# Patient Record
Sex: FEMALE | Race: WHITE | NOT HISPANIC OR LATINO
[De-identification: names, ages, dates, MRNs, and addresses within clinical notes are randomized per-mention and may not be internally consistent; named-entity substitution may affect disease eponyms.]

---

## 2020-11-18 PROBLEM — Z00.00 ENCOUNTER FOR PREVENTIVE HEALTH EXAMINATION: Status: ACTIVE | Noted: 2020-11-18

## 2020-11-24 ENCOUNTER — APPOINTMENT (OUTPATIENT)
Dept: ENDOCRINOLOGY | Facility: CLINIC | Age: 30
End: 2020-11-24
Payer: COMMERCIAL

## 2020-11-24 VITALS
HEIGHT: 66 IN | RESPIRATION RATE: 16 BRPM | TEMPERATURE: 98.2 F | OXYGEN SATURATION: 99 % | SYSTOLIC BLOOD PRESSURE: 126 MMHG | BODY MASS INDEX: 38.09 KG/M2 | WEIGHT: 237 LBS | HEART RATE: 61 BPM | DIASTOLIC BLOOD PRESSURE: 78 MMHG

## 2020-11-24 DIAGNOSIS — Z83.2 FAMILY HISTORY OF DISEASES OF THE BLOOD AND BLOOD-FORMING ORGANS AND CERTAIN DISORDERS INVOLVING THE IMMUNE MECHANISM: ICD-10-CM

## 2020-11-24 DIAGNOSIS — Z78.9 OTHER SPECIFIED HEALTH STATUS: ICD-10-CM

## 2020-11-24 DIAGNOSIS — Z83.49 FAMILY HISTORY OF OTHER ENDOCRINE, NUTRITIONAL AND METABOLIC DISEASES: ICD-10-CM

## 2020-11-24 DIAGNOSIS — L30.9 DERMATITIS, UNSPECIFIED: ICD-10-CM

## 2020-11-24 PROCEDURE — 99204 OFFICE O/P NEW MOD 45 MIN: CPT

## 2020-11-24 NOTE — PHYSICAL EXAM
[Alert] : alert [Well Nourished] : well nourished [No Acute Distress] : no acute distress [Well Developed] : well developed [Normal Sclera/Conjunctiva] : normal sclera/conjunctiva [EOMI] : extra ocular movement intact [No Proptosis] : no proptosis [Normal Oropharynx] : the oropharynx was normal [No Respiratory Distress] : no respiratory distress [No Accessory Muscle Use] : no accessory muscle use [Clear to Auscultation] : lungs were clear to auscultation bilaterally [Normal S1, S2] : normal S1 and S2 [Normal Rate] : heart rate was normal [Regular Rhythm] : with a regular rhythm [No Edema] : no peripheral edema [Pedal Pulses Normal] : the pedal pulses are present [Normal Bowel Sounds] : normal bowel sounds [Not Tender] : non-tender [Not Distended] : not distended [Soft] : abdomen soft [Normal Anterior Cervical Nodes] : no anterior cervical lymphadenopathy [Normal Posterior Cervical Nodes] : no posterior cervical lymphadenopathy [Spine Straight] : spine straight [No Stigmata of Cushings Syndrome] : no stigmata of Cushings Syndrome [Normal Gait] : normal gait [No Rash] : no rash [No Tremors] : no tremors [Oriented x3] : oriented to person, place, and time [Acanthosis Nigricans] : no acanthosis nigricans [de-identified] : thyroid mildly enlarged w possible R  thyroid nodularity

## 2020-11-24 NOTE — ASSESSMENT
[FreeTextEntry1] : ABnormal TFTs : she has a h/o hypothyroidism for 15 years , treated in the past witH LT4 but stopped about 10 years ago.She stopped medication on her own., She had in summer done TSh slightly high 5.2. \par All multiple family  members have hypoT \par no dysphagia, no dysphonia, no neck pain, no voice change\par no family h/o thyroid cancer, no neck XRT\par check tfts \par check TPO ab and Tg ab \par will call pt with results and determine if she needs Lt4 supplements. \par \par Goiter : thyroid mildly enlarged to palpation. Possible nodularity on R side \par check thyroid US to evaluate architecture \par \par Obesity : \par discussed diet and exercise\par encouraged more exercise walking 30 min 3 x week\par See CDE for diet teaching.  Needs to try to have more protein for meals. \par \par

## 2020-11-24 NOTE — HISTORY OF PRESENT ILLNESS
[FreeTextEntry1] : quality: hypoT \par onset 2005\par severity: moderate \par modifying factors: Lt4 helped in the past  \par associated factors: obesity \par '

## 2020-11-27 ENCOUNTER — TRANSCRIPTION ENCOUNTER (OUTPATIENT)
Age: 30
End: 2020-11-27

## 2020-12-03 ENCOUNTER — APPOINTMENT (OUTPATIENT)
Dept: ULTRASOUND IMAGING | Facility: CLINIC | Age: 30
End: 2020-12-03
Payer: COMMERCIAL

## 2020-12-03 ENCOUNTER — RESULT REVIEW (OUTPATIENT)
Age: 30
End: 2020-12-03

## 2020-12-03 ENCOUNTER — OUTPATIENT (OUTPATIENT)
Dept: OUTPATIENT SERVICES | Facility: HOSPITAL | Age: 30
LOS: 1 days | End: 2020-12-03
Payer: COMMERCIAL

## 2020-12-03 DIAGNOSIS — E04.9 NONTOXIC GOITER, UNSPECIFIED: ICD-10-CM

## 2020-12-03 PROCEDURE — 76536 US EXAM OF HEAD AND NECK: CPT | Mod: 26

## 2020-12-03 PROCEDURE — 76536 US EXAM OF HEAD AND NECK: CPT

## 2021-01-13 ENCOUNTER — EMERGENCY (EMERGENCY)
Facility: HOSPITAL | Age: 31
LOS: 1 days | End: 2021-01-13
Payer: COMMERCIAL

## 2021-01-13 VITALS
HEART RATE: 70 BPM | TEMPERATURE: 98 F | SYSTOLIC BLOOD PRESSURE: 129 MMHG | OXYGEN SATURATION: 99 % | RESPIRATION RATE: 18 BRPM | DIASTOLIC BLOOD PRESSURE: 86 MMHG

## 2021-01-13 LAB
FLU A RESULT: SIGNIFICANT CHANGE UP
FLU A RESULT: SIGNIFICANT CHANGE UP
FLUAV AG NPH QL: SIGNIFICANT CHANGE UP
FLUBV AG NPH QL: SIGNIFICANT CHANGE UP
RSV RESULT: SIGNIFICANT CHANGE UP
RSV RNA RESP QL NAA+PROBE: SIGNIFICANT CHANGE UP
SARS-COV-2 RNA SPEC QL NAA+PROBE: SIGNIFICANT CHANGE UP

## 2021-01-13 PROCEDURE — 99284 EMERGENCY DEPT VISIT MOD MDM: CPT

## 2021-01-13 PROCEDURE — 87631 RESP VIRUS 3-5 TARGETS: CPT

## 2021-01-13 PROCEDURE — U0005: CPT

## 2021-01-13 PROCEDURE — 99283 EMERGENCY DEPT VISIT LOW MDM: CPT

## 2021-01-13 PROCEDURE — U0003: CPT

## 2021-01-13 NOTE — ED PROVIDER NOTE - PATIENT PORTAL LINK FT
You can access the FollowMyHealth Patient Portal offered by [facility] by registering at the following website: [email or web address]. By joining [facility]’s FollowMyHealth portal, you will also be able to view your health information using other applications (apps) compatible with our system.

## 2021-01-13 NOTE — ED PROVIDER NOTE - NS ED ROS FT
+ HA and nasal congestion Denies fever, chills, fatigue, and weight loss. Denies Dizziness. ENMT: Denies difficulty swallowing, sore throat, loss of taste or smell. CARDIO: Denies CP, palpitations, edema. RESP: Denies Cough, SOB, Diff breathing, hemoptysis. GI: Denies N/V, ABD pain, change in bowel movement.. MS: Denies joint pain, back pain, weakness, decreased ROM, swelling. NEURO: Denies change in gait, seizures, loss of sensation, dizziness, confusion LOC. SKIN: denies rash or discoloration

## 2021-01-13 NOTE — ED PROVIDER NOTE - OBJECTIVE STATEMENT
COVID SYMPTOMATIC SWAB        Pt is presenting to the ER for covid swab. Pt reports HA and runny nose. Denies fevers chills, loss of taste or smell, denies URI symptoms, denies chest pain or shortness of breath, denies nausea vomiting diarrhea or abdominal pain, and denies weakness or fatigue. Pt requesting testing at this time. [x] known exposure [] no-known exposure [] travel [x] no travel [ ] smoker [x ] non-smoker

## 2021-01-13 NOTE — ED PROVIDER NOTE - CLINICAL SUMMARY MEDICAL DECISION MAKING FREE TEXT BOX
Pt nontoxic appearing, stable vitals, ambulatory with stable saturation without supplemental oxygen. PT does not meet criteria listed in most updated guidelines as per [facility] protocol/algorithm for admission at this time. pt advised about self-quarantine instructions until negative test results and/or symptom resolution. pt advised on hand hygiene, monitoring of symptoms, antipyretic use as well as and fu with primary care provider. Instructions given in pre-printed copy.

## 2021-02-24 ENCOUNTER — APPOINTMENT (OUTPATIENT)
Dept: ENDOCRINOLOGY | Facility: CLINIC | Age: 31
End: 2021-02-24
Payer: COMMERCIAL

## 2021-02-24 VITALS
RESPIRATION RATE: 16 BRPM | SYSTOLIC BLOOD PRESSURE: 120 MMHG | WEIGHT: 249.56 LBS | TEMPERATURE: 97.4 F | HEIGHT: 66 IN | DIASTOLIC BLOOD PRESSURE: 80 MMHG | HEART RATE: 64 BPM | BODY MASS INDEX: 40.11 KG/M2 | OXYGEN SATURATION: 99 %

## 2021-02-24 PROCEDURE — 99214 OFFICE O/P EST MOD 30 MIN: CPT

## 2021-02-24 PROCEDURE — 99072 ADDL SUPL MATRL&STAF TM PHE: CPT

## 2021-02-24 NOTE — ASSESSMENT
[Weight Loss] : weight loss [FreeTextEntry1] : ABnormal TFTs : she has a h/o hypothyroidism for 15 years  \par All multiple family  members have hypoT \par no dysphagia, no dysphonia, no neck pain, no voice change\par check tfts today \par Tpo highly positive \par will call pt with results and determine if she needs Lt4 supplements. \par \par Hashimoto's thyroids : discussed autoimmunity of disease. \par discussed implications for pregnancy  \par \par Goiter : thyroid mildly enlarged to palpation. \par Thyroid US showed no nodules just heterogenous structure. \par \par Obesity : gained 12 lbs.\par start diet 1200 karina/day \par encouraged to stay away from carbs and include more proteins \par discussed diet and exercise\par encouraged more exercise walking 30 min 3 x week\par \par

## 2021-02-24 NOTE — PHYSICAL EXAM
[Alert] : alert [Well Nourished] : well nourished [No Acute Distress] : no acute distress [Well Developed] : well developed [Normal Sclera/Conjunctiva] : normal sclera/conjunctiva [EOMI] : extra ocular movement intact [No Proptosis] : no proptosis [Normal Oropharynx] : the oropharynx was normal [No Respiratory Distress] : no respiratory distress [No Accessory Muscle Use] : no accessory muscle use [Clear to Auscultation] : lungs were clear to auscultation bilaterally [Normal S1, S2] : normal S1 and S2 [Normal Rate] : heart rate was normal [Regular Rhythm] : with a regular rhythm [No Edema] : no peripheral edema [Pedal Pulses Normal] : the pedal pulses are present [Normal Bowel Sounds] : normal bowel sounds [Not Tender] : non-tender [Not Distended] : not distended [Soft] : abdomen soft [Normal Anterior Cervical Nodes] : no anterior cervical lymphadenopathy [Spine Straight] : spine straight [Normal Gait] : normal gait [No Rash] : no rash [No Tremors] : no tremors [Oriented x3] : oriented to person, place, and time [Acanthosis Nigricans] : no acanthosis nigricans [de-identified] : thyroid mildly enlarged w possible R  thyroid nodularity

## 2021-03-10 ENCOUNTER — NON-APPOINTMENT (OUTPATIENT)
Age: 31
End: 2021-03-10

## 2021-09-07 ENCOUNTER — TRANSCRIPTION ENCOUNTER (OUTPATIENT)
Age: 31
End: 2021-09-07

## 2021-09-11 ENCOUNTER — TRANSCRIPTION ENCOUNTER (OUTPATIENT)
Age: 31
End: 2021-09-11

## 2022-06-23 ENCOUNTER — APPOINTMENT (OUTPATIENT)
Dept: ENDOCRINOLOGY | Facility: CLINIC | Age: 32
End: 2022-06-23
Payer: COMMERCIAL

## 2022-06-23 VITALS
DIASTOLIC BLOOD PRESSURE: 80 MMHG | WEIGHT: 250 LBS | SYSTOLIC BLOOD PRESSURE: 118 MMHG | HEIGHT: 66 IN | BODY MASS INDEX: 40.18 KG/M2 | HEART RATE: 75 BPM

## 2022-06-23 DIAGNOSIS — J30.2 OTHER SEASONAL ALLERGIC RHINITIS: ICD-10-CM

## 2022-06-23 PROCEDURE — 36415 COLL VENOUS BLD VENIPUNCTURE: CPT

## 2022-06-23 PROCEDURE — 99215 OFFICE O/P EST HI 40 MIN: CPT | Mod: 25

## 2022-06-23 RX ORDER — AZELASTINE HYDROCHLORIDE 137 UG/1
137 SPRAY, METERED NASAL
Qty: 60 | Refills: 0 | Status: ACTIVE | COMMUNITY
Start: 2022-04-27

## 2022-06-23 RX ORDER — LEVOCETIRIZINE DIHYDROCHLORIDE 5 MG/1
5 TABLET ORAL
Refills: 0 | Status: ACTIVE | COMMUNITY

## 2022-06-23 RX ORDER — DESLORATADINE 5 MG/1
5 TABLET ORAL
Qty: 21 | Refills: 0 | Status: ACTIVE | COMMUNITY
Start: 2022-03-04

## 2022-06-23 RX ORDER — FLUTICASONE PROPIONATE 50 UG/1
50 SPRAY, METERED NASAL
Qty: 48 | Refills: 0 | Status: ACTIVE | COMMUNITY
Start: 2022-04-27

## 2022-06-23 NOTE — HISTORY OF PRESENT ILLNESS
[FreeTextEntry1] : Hashimoto's Hypothyroid since 2005, had been on LT4 ([place]rox by [place]) in the past but stopped meds on her own >10 years ago. \par - (+) fam hx hypothyroidism - mother, father, grandmother. \par - No thyroid nodules on sonogram done 2020.  \par - no recent lab testing\par \par Morbid obesity w BMI 40 -weight gain of 13 pounds over past 2 years.  \par tried intermittent fasting on most days which is helping for past month - fasting 12 hours, eats 10am-8pm - various foods, low carb diet on most days but sometimes eats out.  no diet prior to month ago.  \par exercise 3-4x/week - weight/cardio, 60 min; started this about 1 month ago\par h/o prediabetes in past\par \par current sx - feel well today, (+) weight gain and fatigue all the time despite adequate sleep

## 2022-06-23 NOTE — REVIEW OF SYSTEMS
[As Noted in HPI] : as noted in HPI [Dry Skin] : dry skin [Depression] : depression [Dysphagia] : no dysphagia [Neck Pain] : no neck pain [Dysphonia] : no dysphonia [Palpitations] : no palpitations [Constipation] : no constipation [Diarrhea] : no diarrhea [Irregular Menses] : regular menses [Myalgia] : no myalgia  [Muscle Cramps] : no muscle cramps [Cold Intolerance] : no cold intolerance

## 2022-06-23 NOTE — DATA REVIEWED
[FreeTextEntry1] :  EXAM:  US THYROID AND PARATHYROID\par PROCEDURE DATE:  12/03/2020\par INTERPRETATION:  CLINICAL INFORMATION: Goiter\par COMPARISON: None available.\par TECHNIQUE: Sonography of the thyroid.\par FINDINGS:\par Right Lobe: Heterogeneous echogenicity measuring 5.5 x 1.9 x 2.3 cm. No discrete nodules.\par Left Lobe: Heterogeneous echogenicity measuring 4.9 x 1.7 x 1.9 cm. No discrete nodules\par Isthmus: 5 mm. No discrete nodules.\par Cervical Lymph Nodes: No enlarged or abnormal morphology cervical nodes.\par IMPRESSION: Heterogeneous thyroid gland without definite discrete nodules.\par

## 2022-06-23 NOTE — ASSESSMENT
[FreeTextEntry1] : 32 year old female with\par 1. Hashimoto's hypothyroid - euthyroid on exam, co weigth gain and fatigue\par - TFTs today, consider starting low dose thyroid hormone\par - given that she is child bearing age, I counseled pt on hypothyroidism and pregnancy.  we discussed TSH goals preconception and during pregnancy.  we discussed importance of thyroid hormone dose escalation during 1st trimester pregnancy.  advised that she call office if she is planning to conceive and/or as soon as pregnancy suspected\par \par 2. Goiter \par - check thyroid sonogram\par \par 3. Morbid obesity\par - counseled pt on lifestyle changes with diet and exercise\par - explained that diet consistency and sustainabilitu is important for long term weight loss, cont with Intermittent fasting (discussed healthy ways to achieve this) or if this does not work for her then try Weight watchers\par - cont cardio/weight exercises\par - screen for DM and dyslipidemia, labs today\par \par all questions answered\par 40 min spent w pt\par

## 2022-06-23 NOTE — PHYSICAL EXAM
[Alert] : alert [Obese] : obese [No LAD] : no lymphadenopathy [No Thyroid Nodules] : no palpable thyroid nodules [No Accessory Muscle Use] : no accessory muscle use [Clear to Auscultation] : lungs were clear to auscultation bilaterally [Normal S1, S2] : normal S1 and S2 [Normal Rate] : heart rate was normal [No Edema] : no peripheral edema [Not Tender] : non-tender [Soft] : abdomen soft [No Tremors] : no tremors [Oriented x3] : oriented to person, place, and time [Normal Affect] : the affect was normal [Normal Insight/Judgement] : insight and judgment were intact [Normal Mood] : the mood was normal [de-identified] : mild thyroid enlargement Left >Right,nontender

## 2022-06-24 ENCOUNTER — NON-APPOINTMENT (OUTPATIENT)
Age: 32
End: 2022-06-24

## 2022-06-24 LAB
ALBUMIN SERPL ELPH-MCNC: 4.8 G/DL
ALP BLD-CCNC: 56 U/L
ALT SERPL-CCNC: 33 U/L
ANION GAP SERPL CALC-SCNC: 12 MMOL/L
AST SERPL-CCNC: 23 U/L
BILIRUB SERPL-MCNC: 0.5 MG/DL
BUN SERPL-MCNC: 9 MG/DL
CALCIUM SERPL-MCNC: 9.4 MG/DL
CHLORIDE SERPL-SCNC: 103 MMOL/L
CHOLEST SERPL-MCNC: 153 MG/DL
CO2 SERPL-SCNC: 23 MMOL/L
CREAT SERPL-MCNC: 0.65 MG/DL
EGFR: 121 ML/MIN/1.73M2
ESTIMATED AVERAGE GLUCOSE: 91 MG/DL
GLUCOSE SERPL-MCNC: 107 MG/DL
HBA1C MFR BLD HPLC: 4.8 %
HDLC SERPL-MCNC: 38 MG/DL
LDLC SERPL CALC-MCNC: 82 MG/DL
NONHDLC SERPL-MCNC: 114 MG/DL
POTASSIUM SERPL-SCNC: 4.4 MMOL/L
PROT SERPL-MCNC: 7.1 G/DL
SODIUM SERPL-SCNC: 138 MMOL/L
T4 FREE SERPL-MCNC: 1.3 NG/DL
T4 SERPL-MCNC: 6.8 UG/DL
TRIGL SERPL-MCNC: 162 MG/DL
TSH SERPL-ACNC: 3.55 UIU/ML

## 2022-06-29 LAB
THYROGLOB AB SERPL-ACNC: <20 IU/ML
THYROPEROXIDASE AB SERPL IA-ACNC: 4309 IU/ML

## 2022-09-18 RX ORDER — LEVOTHYROXINE SODIUM 25 UG/1
25 TABLET ORAL DAILY
Qty: 90 | Refills: 1 | Status: DISCONTINUED | COMMUNITY
Start: 2022-06-24 | End: 2022-09-18

## 2022-09-23 ENCOUNTER — APPOINTMENT (OUTPATIENT)
Dept: ENDOCRINOLOGY | Facility: CLINIC | Age: 32
End: 2022-09-23

## 2022-09-23 VITALS
SYSTOLIC BLOOD PRESSURE: 122 MMHG | HEIGHT: 66 IN | BODY MASS INDEX: 38.09 KG/M2 | DIASTOLIC BLOOD PRESSURE: 72 MMHG | WEIGHT: 237 LBS | HEART RATE: 79 BPM

## 2022-09-23 PROCEDURE — 99214 OFFICE O/P EST MOD 30 MIN: CPT

## 2022-09-23 NOTE — ASSESSMENT
[FreeTextEntry1] : 32 year old female with\par 1. Hashimoto's hypothyroid - euthyroid on exam, (+) TPOab, TSH upper limit of normal\par - hold off on startinng low dose Unithroid, repeat TFTs first\par - given that she is child bearing age, I counseled pt on hypothyroidism and pregnancy.  we discussed TSH goals preconception and during pregnancy.  we discussed importance of thyroid hormone dose escalation during 1st trimester pregnancy.  advised that she call office if she is planning to conceive and/or as soon as pregnancy suspected\par \par 2. Goiter \par - check thyroid sonogram\par \par 3. Morbid obesity - has had weight loss with lifestyle changes\par - cont calorie restricting diet with focus on more veggies and protein\par - cont exercise\par \par 4. Hyper Tg, IFG on labs \par - cont lifestyle changes, monitor labs\par \par

## 2022-09-23 NOTE — PHYSICAL EXAM
[Alert] : alert [Obese] : obese [No LAD] : no lymphadenopathy [No Thyroid Nodules] : no palpable thyroid nodules [No Accessory Muscle Use] : no accessory muscle use [Clear to Auscultation] : lungs were clear to auscultation bilaterally [Normal S1, S2] : normal S1 and S2 [Normal Rate] : heart rate was normal [No Edema] : no peripheral edema [Not Tender] : non-tender [Soft] : abdomen soft [No Tremors] : no tremors [Oriented x3] : oriented to person, place, and time [Normal Affect] : the affect was normal [Normal Insight/Judgement] : insight and judgment were intact [Normal Mood] : the mood was normal [de-identified] : mild thyroid enlargement Left >Right,nontender

## 2022-09-23 NOTE — HISTORY OF PRESENT ILLNESS
[FreeTextEntry1] : Interval Hx: did not start LT4, has Rx for Unithroid waiting at pharmacy\par Current thyroid meds: none \par -------------------------------------------------------------------------------------\par Hashimoto's Hypothyroid since 2005, had been on LT4 ([place]rox by [place]) in the past but stopped meds on her own >10 years ago. \par - (+) fam hx hypothyroidism - mother, father, grandmother. \par - No thyroid nodules on sonogram done 2020.  \par \par Morbid obesity w BMI 40 -since last visit lost 13 pounds with increased physical activity and caloric restricting, low carbs. Use carb manager aracelis.\par h/o prediabetes in past\par \par

## 2023-01-20 ENCOUNTER — TRANSCRIPTION ENCOUNTER (OUTPATIENT)
Age: 33
End: 2023-01-20

## 2023-03-30 ENCOUNTER — APPOINTMENT (OUTPATIENT)
Dept: ENDOCRINOLOGY | Facility: CLINIC | Age: 33
End: 2023-03-30

## 2023-07-21 ENCOUNTER — APPOINTMENT (OUTPATIENT)
Dept: ENDOCRINOLOGY | Facility: CLINIC | Age: 33
End: 2023-07-21
Payer: COMMERCIAL

## 2023-07-21 VITALS
OXYGEN SATURATION: 99 % | WEIGHT: 245 LBS | DIASTOLIC BLOOD PRESSURE: 78 MMHG | SYSTOLIC BLOOD PRESSURE: 124 MMHG | HEIGHT: 66 IN | HEART RATE: 94 BPM | BODY MASS INDEX: 39.37 KG/M2

## 2023-07-21 DIAGNOSIS — R73.01 IMPAIRED FASTING GLUCOSE: ICD-10-CM

## 2023-07-21 DIAGNOSIS — E78.1 PURE HYPERGLYCERIDEMIA: ICD-10-CM

## 2023-07-21 LAB — TSH SERPL-ACNC: 6.88

## 2023-07-21 PROCEDURE — 99214 OFFICE O/P EST MOD 30 MIN: CPT

## 2023-07-21 RX ORDER — LEVOTHYROXINE SODIUM 25 UG/1
25 TABLET ORAL DAILY
Qty: 90 | Refills: 0 | Status: DISCONTINUED | COMMUNITY
Start: 2022-09-18 | End: 2023-07-21

## 2023-07-21 NOTE — REVIEW OF SYSTEMS
[Dry Skin] : dry skin [Depression] : depression [As Noted in HPI] : as noted in HPI [Dysphagia] : no dysphagia [Neck Pain] : no neck pain [Dysphonia] : no dysphonia [Palpitations] : no palpitations [Constipation] : no constipation [Diarrhea] : no diarrhea [Irregular Menses] : regular menses [Myalgia] : no myalgia  [Muscle Cramps] : no muscle cramps [Cold Intolerance] : no cold intolerance [de-identified] : chronic eczema

## 2023-07-21 NOTE — PHYSICAL EXAM
[Alert] : alert [Obese] : obese [No LAD] : no lymphadenopathy [No Thyroid Nodules] : no palpable thyroid nodules [Clear to Auscultation] : lungs were clear to auscultation bilaterally [Normal S1, S2] : normal S1 and S2 [Normal Rate] : heart rate was normal [No Edema] : no peripheral edema [Not Tender] : non-tender [Soft] : abdomen soft [No Tremors] : no tremors [Oriented x3] : oriented to person, place, and time [Normal Affect] : the affect was normal [Normal Insight/Judgement] : insight and judgment were intact [Normal Mood] : the mood was normal [No Respiratory Distress] : no respiratory distress [de-identified] : mild thyroid enlargement Left >Right,nontender

## 2023-07-21 NOTE — HISTORY OF PRESENT ILLNESS
[FreeTextEntry1] : Interval Hx: did not start LT4, last seen 9/2022\par - weight gain since last visit, planning for gastric sleeve 8/2023 with Dr Beck and recent labs showed elevated TSH\par Current thyroid meds: none \par -------------------------------------------------------------------------------------\par Hashimoto's Hypothyroid since 2005, had been on LT4 (Letrox by [place]) in the past but stopped meds on her own >10 years ago. \par - (+) fam hx hypothyroidism - mother, father, grandmother. \par - No thyroid nodules on sonogram done 2020.  \par \par Morbid obesity w BMI 39\par h/o prediabetes in past\par \par

## 2023-07-21 NOTE — ASSESSMENT
[FreeTextEntry1] : 33 year old female with\par 1. Hashimoto's hypothyroid - euthyroid on exam, (+) TPOab, TSH elevated\par - start Unithroid 50 mcg daily, Rx sent, repeat TFTs 1 wekk before next visitt\par - given that she is child bearing age, I counseled pt on hypothyroidism and pregnancy.  we discussed TSH goals preconception and during pregnancy.  we discussed importance of thyroid hormone dose escalation during 1st trimester pregnancy.  advised that she call office if she is planning to conceive and/or as soon as pregnancy suspected\par \par 2. Goiter \par - check thyroid sonogram\par \par 3. Morbid obesity - has had weight loss with lifestyle changes\par - planning on bariatric surgery\par - try and normalize TSH prior to surgerym, start LT4 supplementaion\par - screen for cushings w 1 mg DST\par \par \par

## 2023-07-21 NOTE — DATA REVIEWED
[FreeTextEntry1] :  EXAM:  US THYROID AND PARATHYROID\par PROCEDURE DATE:  12/03/2020\par INTERPRETATION:  CLINICAL INFORMATION: Goiter\par COMPARISON: None available.\par TECHNIQUE: Sonography of the thyroid.\par FINDINGS:\par Right Lobe: Heterogeneous echogenicity measuring 5.5 x 1.9 x 2.3 cm. No discrete nodules.\par Left Lobe: Heterogeneous echogenicity measuring 4.9 x 1.7 x 1.9 cm. No discrete nodules\par Isthmus: 5 mm. No discrete nodules.\par Cervical Lymph Nodes: No enlarged or abnormal morphology cervical nodes.\par IMPRESSION: Heterogeneous thyroid gland without definite discrete nodules.\par \par Labs 7/3/23\par TSH 6.880, T4 5.7, T3 108\par Gluc 94, A1c 4.9\par

## 2023-08-01 ENCOUNTER — TRANSCRIPTION ENCOUNTER (OUTPATIENT)
Age: 33
End: 2023-08-01

## 2023-08-01 LAB
CORTICOSTEROID BIND GLOBULIN: 1.5 MG/DL
CORTIS SERPL-MCNC: <1 UG/DL
CORTISOL, FREE: <0.04 UG/DL
PFCX: <3.7 %

## 2023-09-19 ENCOUNTER — APPOINTMENT (OUTPATIENT)
Dept: ENDOCRINOLOGY | Facility: CLINIC | Age: 33
End: 2023-09-19

## 2024-04-17 ENCOUNTER — APPOINTMENT (OUTPATIENT)
Dept: ENDOCRINOLOGY | Facility: CLINIC | Age: 34
End: 2024-04-17
Payer: COMMERCIAL

## 2024-04-17 VITALS
HEART RATE: 54 BPM | HEIGHT: 66 IN | WEIGHT: 168 LBS | OXYGEN SATURATION: 99 % | BODY MASS INDEX: 27 KG/M2 | DIASTOLIC BLOOD PRESSURE: 60 MMHG | SYSTOLIC BLOOD PRESSURE: 94 MMHG

## 2024-04-17 DIAGNOSIS — E04.9 NONTOXIC GOITER, UNSPECIFIED: ICD-10-CM

## 2024-04-17 DIAGNOSIS — E66.01 MORBID (SEVERE) OBESITY DUE TO EXCESS CALORIES: ICD-10-CM

## 2024-04-17 DIAGNOSIS — E06.3 AUTOIMMUNE THYROIDITIS: ICD-10-CM

## 2024-04-17 LAB — TSH SERPL-ACNC: 2.64

## 2024-04-17 PROCEDURE — 99214 OFFICE O/P EST MOD 30 MIN: CPT

## 2024-04-17 RX ORDER — LEVOTHYROXINE SODIUM 50 UG/1
50 TABLET ORAL DAILY
Qty: 90 | Refills: 0 | Status: ACTIVE | COMMUNITY
Start: 2023-07-21 | End: 1900-01-01

## 2024-04-17 RX ORDER — DEXAMETHASONE 1 MG/1
1 TABLET ORAL
Qty: 1 | Refills: 0 | Status: DISCONTINUED | COMMUNITY
Start: 2023-07-21 | End: 2024-04-17

## 2024-04-17 NOTE — HISTORY OF PRESENT ILLNESS
[FreeTextEntry1] : Interval Hx: - s/p gastric sleeve 8/2023 with Dr Beck, lost 77 pounds after surgery. now walking [id number] steps/daily and runs 5K 3x/weekly. ------------------------------------------------------------------------------------- Hashimoto's Hypothyroid since 2005, had been on LT4 ([place]rox by [place]) in the past but stopped meds on her own >10 years ago.  - (+) fam hx hypothyroidism - mother, father, grandmother.  - No thyroid nodules on sonogram done 2020.   - current thyroid med: Unithroid 50 mcg daily, adherent with dosing and administration

## 2024-04-17 NOTE — PHYSICAL EXAM
[Alert] : alert [Obese] : obese [No LAD] : no lymphadenopathy [No Thyroid Nodules] : no palpable thyroid nodules [No Respiratory Distress] : no respiratory distress [Clear to Auscultation] : lungs were clear to auscultation bilaterally [Normal S1, S2] : normal S1 and S2 [Normal Rate] : heart rate was normal [No Edema] : no peripheral edema [Not Tender] : non-tender [Soft] : abdomen soft [No Tremors] : no tremors [Oriented x3] : oriented to person, place, and time [Normal Affect] : the affect was normal [Normal Insight/Judgement] : insight and judgment were intact [Normal Mood] : the mood was normal [de-identified] : mild thyroid enlargement Left >Right,nontender

## 2024-04-17 NOTE — DATA REVIEWED
[FreeTextEntry1] :  EXAM:  US THYROID AND PARATHYROID PROCEDURE DATE:  12/03/2020 COMPARISON: None available. FINDINGS: Right Lobe: Heterogeneous echogenicity measuring 5.5 x 1.9 x 2.3 cm. No discrete nodules. Left Lobe: Heterogeneous echogenicity measuring 4.9 x 1.7 x 1.9 cm. No discrete nodules Isthmus: 5 mm. No discrete nodules. Cervical Lymph Nodes: No enlarged or abnormal morphology cervical nodes. IMPRESSION: Heterogeneous thyroid gland without definite discrete nodules. =============================================================== Labs 7/3/23 TSH 6.880, T4 5.7, T3 108 Gluc 94, A1c 4.9  Labs 8/2023 1 mg DST, cortisol <1 Labs 1/2024 TSH 2.64, LDL 63, Gluc 85, A1c 4.5

## 2024-04-17 NOTE — ASSESSMENT
[FreeTextEntry1] : 33 year old female with 1. Hashimoto's hypothyroid - euthyroid on exam, (+) TPOab, TSH elevated - start Unithroid 50 mcg daily, Rx sent, repeat TFTs 1 wekk before next visitt - given that she is child bearing age, I counseled pt on hypothyroidism and pregnancy.  we discussed TSH goals preconception and during pregnancy.  we discussed importance of thyroid hormone dose escalation during 1st trimester pregnancy.  advised that she call office if she is planning to conceive and/or as soon as pregnancy suspected  2. Goiter  - check thyroid sonogram  3. Morbid obesity - has had weight loss with gastric sleeve - she is euthyroid and normal 1 mg DST - cont lifestyle changes with gastric sleeve diet and daily exercise

## 2024-04-17 NOTE — REVIEW OF SYSTEMS
[Dry Skin] : dry skin [Depression] : depression [Recent Weight Loss (___ Lbs)] : recent weight loss: [unfilled] lbs [Dysphagia] : no dysphagia [Neck Pain] : no neck pain [Dysphonia] : no dysphonia [Palpitations] : no palpitations [Constipation] : no constipation [Diarrhea] : no diarrhea [Irregular Menses] : regular menses [Myalgia] : no myalgia  [Muscle Cramps] : no muscle cramps [Cold Intolerance] : no cold intolerance [de-identified] : chronic eczema

## 2024-07-16 ENCOUNTER — RX RENEWAL (OUTPATIENT)
Age: 34
End: 2024-07-16

## 2024-10-10 ENCOUNTER — APPOINTMENT (OUTPATIENT)
Dept: ULTRASOUND IMAGING | Facility: CLINIC | Age: 34
End: 2024-10-10
Payer: COMMERCIAL

## 2024-10-10 ENCOUNTER — OUTPATIENT (OUTPATIENT)
Dept: OUTPATIENT SERVICES | Facility: HOSPITAL | Age: 34
LOS: 1 days | End: 2024-10-10
Payer: COMMERCIAL

## 2024-10-10 DIAGNOSIS — Z00.8 ENCOUNTER FOR OTHER GENERAL EXAMINATION: ICD-10-CM

## 2024-10-10 PROCEDURE — 76536 US EXAM OF HEAD AND NECK: CPT | Mod: 26

## 2024-10-10 PROCEDURE — 76536 US EXAM OF HEAD AND NECK: CPT

## 2024-10-17 LAB
T4 FREE SERPL-MCNC: 1.1
TSH SERPL-ACNC: 2.09

## 2024-10-18 ENCOUNTER — APPOINTMENT (OUTPATIENT)
Dept: ENDOCRINOLOGY | Facility: CLINIC | Age: 34
End: 2024-10-18
Payer: COMMERCIAL

## 2024-10-18 VITALS
HEART RATE: 76 BPM | BODY MASS INDEX: 24.11 KG/M2 | WEIGHT: 150 LBS | SYSTOLIC BLOOD PRESSURE: 100 MMHG | HEIGHT: 66 IN | DIASTOLIC BLOOD PRESSURE: 70 MMHG | OXYGEN SATURATION: 99 %

## 2024-10-18 DIAGNOSIS — E04.9 NONTOXIC GOITER, UNSPECIFIED: ICD-10-CM

## 2024-10-18 DIAGNOSIS — E06.3 AUTOIMMUNE THYROIDITIS: ICD-10-CM

## 2024-10-18 PROCEDURE — 99214 OFFICE O/P EST MOD 30 MIN: CPT

## 2025-02-11 ENCOUNTER — APPOINTMENT (OUTPATIENT)
Dept: ORTHOPEDIC SURGERY | Facility: CLINIC | Age: 35
End: 2025-02-11
Payer: COMMERCIAL

## 2025-02-11 VITALS — HEIGHT: 66 IN | WEIGHT: 145 LBS | BODY MASS INDEX: 23.3 KG/M2

## 2025-02-11 DIAGNOSIS — M79.641 PAIN IN RIGHT HAND: ICD-10-CM

## 2025-02-11 PROCEDURE — 99204 OFFICE O/P NEW MOD 45 MIN: CPT

## 2025-02-11 PROCEDURE — 73110 X-RAY EXAM OF WRIST: CPT | Mod: 50

## 2025-02-11 RX ORDER — MELOXICAM 15 MG/1
15 TABLET ORAL DAILY
Qty: 15 | Refills: 1 | Status: ACTIVE | COMMUNITY
Start: 2025-02-11 | End: 1900-01-01

## 2025-02-17 ENCOUNTER — APPOINTMENT (OUTPATIENT)
Dept: MRI IMAGING | Facility: CLINIC | Age: 35
End: 2025-02-17

## 2025-05-08 ENCOUNTER — NON-APPOINTMENT (OUTPATIENT)
Age: 35
End: 2025-05-08

## 2025-05-08 LAB — TSH SERPL-ACNC: 2.59

## 2025-05-09 ENCOUNTER — APPOINTMENT (OUTPATIENT)
Dept: ENDOCRINOLOGY | Facility: CLINIC | Age: 35
End: 2025-05-09
Payer: COMMERCIAL

## 2025-05-09 VITALS
DIASTOLIC BLOOD PRESSURE: 58 MMHG | HEART RATE: 65 BPM | OXYGEN SATURATION: 99 % | SYSTOLIC BLOOD PRESSURE: 104 MMHG | WEIGHT: 155 LBS | BODY MASS INDEX: 24.91 KG/M2 | HEIGHT: 66 IN

## 2025-05-09 DIAGNOSIS — E04.9 NONTOXIC GOITER, UNSPECIFIED: ICD-10-CM

## 2025-05-09 DIAGNOSIS — E06.3 AUTOIMMUNE THYROIDITIS: ICD-10-CM

## 2025-05-09 PROCEDURE — 99214 OFFICE O/P EST MOD 30 MIN: CPT

## 2025-05-22 DIAGNOSIS — Z01.419 ENCOUNTER FOR GYNECOLOGICAL EXAMINATION (GENERAL) (ROUTINE) W/OUT ABNORMAL FINDINGS: ICD-10-CM

## 2025-05-23 ENCOUNTER — APPOINTMENT (OUTPATIENT)
Dept: OBGYN | Facility: CLINIC | Age: 35
End: 2025-05-23
Payer: COMMERCIAL

## 2025-05-23 ENCOUNTER — NON-APPOINTMENT (OUTPATIENT)
Age: 35
End: 2025-05-23

## 2025-05-23 VITALS
HEIGHT: 66 IN | DIASTOLIC BLOOD PRESSURE: 64 MMHG | SYSTOLIC BLOOD PRESSURE: 104 MMHG | BODY MASS INDEX: 24.91 KG/M2 | WEIGHT: 155 LBS

## 2025-05-23 DIAGNOSIS — Z01.419 ENCOUNTER FOR GYNECOLOGICAL EXAMINATION (GENERAL) (ROUTINE) W/OUT ABNORMAL FINDINGS: ICD-10-CM

## 2025-05-23 DIAGNOSIS — Z86.2 PERSONAL HISTORY OF DISEASES OF THE BLOOD AND BLOOD-FORMING ORGANS AND CERTAIN DISORDERS INVOLVING THE IMMUNE MECHANISM: ICD-10-CM

## 2025-05-23 PROCEDURE — 99385 PREV VISIT NEW AGE 18-39: CPT

## 2025-05-27 LAB — HPV HIGH+LOW RISK DNA PNL CVX: NOT DETECTED

## 2025-05-28 ENCOUNTER — APPOINTMENT (OUTPATIENT)
Dept: DERMATOLOGY | Facility: CLINIC | Age: 35
End: 2025-05-28
Payer: COMMERCIAL

## 2025-05-28 PROCEDURE — 99202 OFFICE O/P NEW SF 15 MIN: CPT | Mod: 25

## 2025-05-28 PROCEDURE — 11102 TANGNTL BX SKIN SINGLE LES: CPT

## 2025-05-31 LAB — CYTOLOGY CVX/VAG DOC THIN PREP: ABNORMAL
